# Patient Record
Sex: FEMALE | Race: WHITE | NOT HISPANIC OR LATINO | Employment: OTHER | URBAN - METROPOLITAN AREA
[De-identification: names, ages, dates, MRNs, and addresses within clinical notes are randomized per-mention and may not be internally consistent; named-entity substitution may affect disease eponyms.]

---

## 2017-03-22 NOTE — PATIENT DISCUSSION
Ectropion Counseling: The nature of the diagnosis and associated symptoms were discussed. The patient understands that there is a risk of corneal exposure, redness, dryness and even scarring from chronic exposure. Artificial tears, lubricating gel or ointment are recommended for the protection of the ocular surface.   Return for follow-up as scheduled or sooner if symptoms worsen

## 2017-03-22 NOTE — PATIENT DISCUSSION
Dermatochalasis Counseling: I have explained the diagnosis of dermatochalasis to the patient. The resulting symptoms, including but not limited to visual field deficits, blepharitis, and/or dry eye symptoms from eyelid wick syndrome, that may result if left untreated were discussed with the patient. The patient understands that intervention is elective surgery and consultation with an oculoplastic specialist can be scheduled at their convenience.

## 2017-03-22 NOTE — PATIENT DISCUSSION
ECTROPION, OU: NOT VISUALLY SIGNIFICANT TO THE PATIENT. RECOMMEND ARTIFICIAL TEARS OR OR LUBRICATING OINTMENT PRN.

## 2017-08-21 ENCOUNTER — ALLSCRIPTS OFFICE VISIT (OUTPATIENT)
Dept: OTHER | Facility: OTHER | Age: 72
End: 2017-08-21

## 2017-08-21 DIAGNOSIS — Z12.31 ENCOUNTER FOR SCREENING MAMMOGRAM FOR MALIGNANT NEOPLASM OF BREAST: ICD-10-CM

## 2017-09-28 NOTE — PATIENT DISCUSSION
1.  Nuclear Sclerotic Cataract OD:  Large Myopic shift---  Explained how cataracts can effect vision. Pt wants to have cat removed. Pt interested  in dist OD and will use readers for close up. 2.  Pseudophakia OS - IOL stable. Open capsule OS Monitor. 3. Dry Eyes OU --   Pt has been able to control with drops and gels but had a bad month of July. Her allergies have been off and on. Environmental changes to minimize dryness and exposure and the use of artificial tears were recommended. No plugs present in ou.   4.  1 [employer] OD cat

## 2017-10-12 NOTE — PATIENT DISCUSSION
1.  Cataract OD: Discussed the risks benefits alternatives and limitations of cataract surgery including infection bleeding loss of vision retinal tears detachment. The patient stated a full understanding and a desire to proceed with the procedure in the right eye. Refractive options reviewed. H/o [address] ou Patient understands IOL calcs are more difficult and that intraoperative ORA measurements will increase the success but does not guarantee not needing a refractive enhancement. Pt desired to schedule surgery OD with ORA guidance. Quoc admit. 2. Pseudophakia OS - IOL stable. Monitor. 3.  s/p PRK4. Return for an appointment for [address] [place] [address] with Dr. Evelyn Matthew.

## 2017-10-25 NOTE — PATIENT DISCUSSION
Cataract OD: Discussed the risks benefits alternatives and limitations of cataract surgery including infection bleeding loss of vision retinal tears detachment. The patient stated a full understanding and a desire to proceed with the procedure in the right eye. Refractive options reviewed. Patient understands IOL calcs are more difficult and that intraoperative ORA measurements will increase the success but does not guarantee not needing a refractive enhancement. Pt desired to schedule surgery OD with ORA guidance.

## 2017-10-25 NOTE — PATIENT DISCUSSION
Combined Types of Cataract OD: Discussed the risks benefits alternatives and limitations of cataract surgery including infection bleeding loss of vision retinal tears detachment. The patient stated a full understanding and a desire to proceed with the procedure in the right eye. Refractive options were reviewed. Patient has elected to be optimized for distance vision in the right eye. The patient will still need glasses for reading and to possibly fine tune distance vision.

## 2017-11-16 NOTE — PATIENT DISCUSSION
1.  Pseudophakia OD - Post-Op Day #1 - Cataract Surgery Right Eye (OD) - doing well. IOP slightly elevated. Continue po drops as instructed. Call office with symptoms of pain redness or decreased vision right eye. Use tears often. Add Lumigan qhs OD till next visit. 2.   RTN 1 week PO-  change rx

## 2017-11-27 NOTE — PATIENT DISCUSSION
1.  Pseudophakia OD - 1 week PO-- Surgery very well healed. Continue to use artificial tears as needed for ocular surface dryness. Finish po drops according to schedule. Dc Travatan Cont with other 2 cat meds. and lots of tears. 2.  Change rx in both lenses. 3. RTN 5-6 mths CE after OD cat sx.

## 2018-01-14 VITALS
HEIGHT: 61 IN | DIASTOLIC BLOOD PRESSURE: 72 MMHG | BODY MASS INDEX: 26.06 KG/M2 | SYSTOLIC BLOOD PRESSURE: 124 MMHG | WEIGHT: 138 LBS

## 2018-02-26 NOTE — PATIENT DISCUSSION
1.  Pseudophakia OU - IOL stable. MONO  OD capsule clear OS capsule open. Monitor. 2. Dry Eyes OU -- Environmental changes to minimize dryness and exposure and the use of artificial tears were recommended. Pt using tears and gels to control it. Allergies have been bad. 3. Rx for reading. Has dist rx4. Floaters ou.5..   RTN 1 yr CE

## 2018-06-19 RX ORDER — MAGNESIUM 30 MG
30 TABLET ORAL 2 TIMES DAILY
COMMUNITY

## 2018-06-19 RX ORDER — DIPHENOXYLATE HYDROCHLORIDE AND ATROPINE SULFATE 2.5; .025 MG/1; MG/1
1 TABLET ORAL DAILY
COMMUNITY

## 2018-06-19 RX ORDER — AMLODIPINE BESYLATE 5 MG/1
5 TABLET ORAL DAILY
COMMUNITY

## 2018-06-19 RX ORDER — PHENOL 1.4 %
600 AEROSOL, SPRAY (ML) MUCOUS MEMBRANE 2 TIMES DAILY WITH MEALS
COMMUNITY

## 2018-06-19 RX ORDER — SUMATRIPTAN 25 MG/1
25 TABLET, FILM COATED ORAL ONCE AS NEEDED
COMMUNITY

## 2018-06-19 NOTE — PRE-PROCEDURE INSTRUCTIONS
Pre-Surgery Instructions:   Medication Instructions    amLODIPine (NORVASC) 5 mg tablet Instructed patient per Anesthesia Guidelines   calcium carbonate (OS-JR) 600 MG tablet Patient was instructed by Physician and understands   magnesium 30 MG tablet Patient was instructed by Physician and understands   multivitamin [facility]) TABS Patient was instructed by Physician and understands   SUMAtriptan (IMITREX) 25 mg tablet Instructed patient per Anesthesia Guidelines  Pre op and bathing instructions reviewed

## 2018-06-22 ENCOUNTER — ANESTHESIA EVENT (OUTPATIENT)
Dept: PERIOP | Facility: HOSPITAL | Age: 73
End: 2018-06-22
Payer: MEDICARE

## 2018-06-25 ENCOUNTER — HOSPITAL ENCOUNTER (OUTPATIENT)
Facility: HOSPITAL | Age: 73
Setting detail: OUTPATIENT SURGERY
Discharge: HOME | End: 2018-06-26
Attending: OBSTETRICS & GYNECOLOGY | Admitting: OBSTETRICS & GYNECOLOGY
Payer: MEDICARE

## 2018-06-25 ENCOUNTER — ANESTHESIA (OUTPATIENT)
Dept: PERIOP | Facility: HOSPITAL | Age: 73
End: 2018-06-25
Payer: MEDICARE

## 2018-06-25 PROBLEM — N81.9 PROLAPSE OF FEMALE PELVIC ORGANS: Status: ACTIVE | Noted: 2018-06-25

## 2018-06-25 PROCEDURE — 94762 N-INVAS EAR/PLS OXIMTRY CONT: CPT

## 2018-06-25 PROCEDURE — 94760 N-INVAS EAR/PLS OXIMETRY 1: CPT

## 2018-06-25 PROCEDURE — C1771 REP DEV, URINARY, W/SLING: HCPCS | Performed by: OBSTETRICS & GYNECOLOGY

## 2018-06-25 PROCEDURE — C1781 MESH (IMPLANTABLE): HCPCS | Performed by: OBSTETRICS & GYNECOLOGY

## 2018-06-25 DEVICE — SINGLE INCISION SLING SYSTEM
Type: IMPLANTABLE DEVICE | Site: VAGINA | Status: FUNCTIONAL
Brand: ALTIS

## 2018-06-25 DEVICE — SLING PROLAPSE REPAIR TRANSVAGINAL ELEVATE ANTERIOR 720093-01: Type: IMPLANTABLE DEVICE | Site: UTERUS | Status: FUNCTIONAL

## 2018-06-25 RX ORDER — SODIUM CHLORIDE, SODIUM LACTATE, POTASSIUM CHLORIDE, CALCIUM CHLORIDE 600; 310; 30; 20 MG/100ML; MG/100ML; MG/100ML; MG/100ML
20 INJECTION, SOLUTION INTRAVENOUS CONTINUOUS
Status: DISCONTINUED | OUTPATIENT
Start: 2018-06-25 | End: 2018-06-26 | Stop reason: HOSPADM

## 2018-06-25 RX ORDER — SODIUM CHLORIDE 9 MG/ML
75 INJECTION, SOLUTION INTRAVENOUS CONTINUOUS
Status: DISCONTINUED | OUTPATIENT
Start: 2018-06-25 | End: 2018-06-26 | Stop reason: HOSPADM

## 2018-06-25 RX ORDER — LIDOCAINE HYDROCHLORIDE 20 MG/ML
INJECTION, SOLUTION EPIDURAL; INFILTRATION; INTRACAUDAL; PERINEURAL AS NEEDED
Status: DISCONTINUED | OUTPATIENT
Start: 2018-06-25 | End: 2018-06-25 | Stop reason: SURG

## 2018-06-25 RX ORDER — TRAMADOL HYDROCHLORIDE 50 MG/1
50 TABLET ORAL EVERY 6 HOURS PRN
Status: DISCONTINUED | OUTPATIENT
Start: 2018-06-25 | End: 2018-06-26 | Stop reason: HOSPADM

## 2018-06-25 RX ORDER — IBUPROFEN 600 MG/1
600 TABLET ORAL EVERY 6 HOURS PRN
Status: DISCONTINUED | OUTPATIENT
Start: 2018-06-25 | End: 2018-06-26 | Stop reason: HOSPADM

## 2018-06-25 RX ORDER — SODIUM CHLORIDE, SODIUM LACTATE, POTASSIUM CHLORIDE, CALCIUM CHLORIDE 600; 310; 30; 20 MG/100ML; MG/100ML; MG/100ML; MG/100ML
INJECTION, SOLUTION INTRAVENOUS CONTINUOUS PRN
Status: DISCONTINUED | OUTPATIENT
Start: 2018-06-25 | End: 2018-06-25 | Stop reason: SURG

## 2018-06-25 RX ORDER — ONDANSETRON 2 MG/ML
4 INJECTION INTRAMUSCULAR; INTRAVENOUS ONCE AS NEEDED
Status: DISCONTINUED | OUTPATIENT
Start: 2018-06-25 | End: 2018-06-25 | Stop reason: HOSPADM

## 2018-06-25 RX ORDER — ONDANSETRON 2 MG/ML
4 INJECTION INTRAMUSCULAR; INTRAVENOUS EVERY 6 HOURS PRN
Status: DISCONTINUED | OUTPATIENT
Start: 2018-06-25 | End: 2018-06-26 | Stop reason: HOSPADM

## 2018-06-25 RX ORDER — PROPOFOL 10 MG/ML
INJECTION, EMULSION INTRAVENOUS CONTINUOUS PRN
Status: DISCONTINUED | OUTPATIENT
Start: 2018-06-25 | End: 2018-06-25 | Stop reason: SURG

## 2018-06-25 RX ORDER — DOCUSATE SODIUM 100 MG/1
100 CAPSULE, LIQUID FILLED ORAL 2 TIMES DAILY
Status: DISCONTINUED | OUTPATIENT
Start: 2018-06-25 | End: 2018-06-26 | Stop reason: HOSPADM

## 2018-06-25 RX ORDER — FUROSEMIDE 10 MG/ML
INJECTION INTRAMUSCULAR; INTRAVENOUS AS NEEDED
Status: DISCONTINUED | OUTPATIENT
Start: 2018-06-25 | End: 2018-06-25 | Stop reason: SURG

## 2018-06-25 RX ORDER — FENTANYL CITRATE/PF 50 MCG/ML
25 SYRINGE (ML) INJECTION
Status: DISCONTINUED | OUTPATIENT
Start: 2018-06-25 | End: 2018-06-25 | Stop reason: HOSPADM

## 2018-06-25 RX ORDER — SUMATRIPTAN 25 MG/1
25 TABLET, FILM COATED ORAL ONCE AS NEEDED
Status: DISCONTINUED | OUTPATIENT
Start: 2018-06-25 | End: 2018-06-26 | Stop reason: HOSPADM

## 2018-06-25 RX ORDER — CALCIUM CARBONATE 500(1250)
1 TABLET ORAL
Status: DISCONTINUED | OUTPATIENT
Start: 2018-06-26 | End: 2018-06-26 | Stop reason: HOSPADM

## 2018-06-25 RX ORDER — AMLODIPINE BESYLATE 5 MG/1
5 TABLET ORAL DAILY
Status: DISCONTINUED | OUTPATIENT
Start: 2018-06-26 | End: 2018-06-26 | Stop reason: HOSPADM

## 2018-06-25 RX ORDER — FENTANYL CITRATE 50 UG/ML
INJECTION, SOLUTION INTRAMUSCULAR; INTRAVENOUS AS NEEDED
Status: DISCONTINUED | OUTPATIENT
Start: 2018-06-25 | End: 2018-06-25 | Stop reason: SURG

## 2018-06-25 RX ORDER — KETOROLAC TROMETHAMINE 30 MG/ML
INJECTION, SOLUTION INTRAMUSCULAR; INTRAVENOUS AS NEEDED
Status: DISCONTINUED | OUTPATIENT
Start: 2018-06-25 | End: 2018-06-25 | Stop reason: SURG

## 2018-06-25 RX ORDER — BUPIVACAINE HYDROCHLORIDE AND EPINEPHRINE 2.5; 5 MG/ML; UG/ML
INJECTION, SOLUTION INFILTRATION; PERINEURAL AS NEEDED
Status: DISCONTINUED | OUTPATIENT
Start: 2018-06-25 | End: 2018-06-25 | Stop reason: HOSPADM

## 2018-06-25 RX ORDER — ONDANSETRON 2 MG/ML
INJECTION INTRAMUSCULAR; INTRAVENOUS AS NEEDED
Status: DISCONTINUED | OUTPATIENT
Start: 2018-06-25 | End: 2018-06-25 | Stop reason: SURG

## 2018-06-25 RX ORDER — ACETAMINOPHEN 325 MG/1
650 TABLET ORAL EVERY 4 HOURS PRN
Status: DISCONTINUED | OUTPATIENT
Start: 2018-06-25 | End: 2018-06-26 | Stop reason: HOSPADM

## 2018-06-25 RX ADMIN — LIDOCAINE HYDROCHLORIDE 10 ML: 20 INJECTION, SOLUTION EPIDURAL; INFILTRATION; INTRACAUDAL; PERINEURAL at 07:45

## 2018-06-25 RX ADMIN — LIDOCAINE HYDROCHLORIDE 5 ML: 20 INJECTION, SOLUTION EPIDURAL; INFILTRATION; INTRACAUDAL; PERINEURAL at 07:52

## 2018-06-25 RX ADMIN — FENTANYL CITRATE 25 MCG: 50 INJECTION INTRAMUSCULAR; INTRAVENOUS at 07:37

## 2018-06-25 RX ADMIN — ONDANSETRON 4 MG: 2 INJECTION INTRAMUSCULAR; INTRAVENOUS at 08:50

## 2018-06-25 RX ADMIN — CEFAZOLIN SODIUM 1000 MG: 1 SOLUTION INTRAVENOUS at 07:55

## 2018-06-25 RX ADMIN — FENTANYL CITRATE 25 MCG: 50 INJECTION INTRAMUSCULAR; INTRAVENOUS at 07:42

## 2018-06-25 RX ADMIN — DOCUSATE SODIUM 100 MG: 100 CAPSULE, LIQUID FILLED ORAL at 17:29

## 2018-06-25 RX ADMIN — PROPOFOL 30 MCG/KG/MIN: 10 INJECTION, EMULSION INTRAVENOUS at 07:54

## 2018-06-25 RX ADMIN — SODIUM CHLORIDE, SODIUM LACTATE, POTASSIUM CHLORIDE, AND CALCIUM CHLORIDE: .6; .31; .03; .02 INJECTION, SOLUTION INTRAVENOUS at 07:10

## 2018-06-25 RX ADMIN — DEXAMETHASONE SODIUM PHOSPHATE 5 MG: 10 INJECTION INTRAMUSCULAR; INTRAVENOUS at 08:00

## 2018-06-25 RX ADMIN — FUROSEMIDE 10 MG: 10 INJECTION, SOLUTION INTRAMUSCULAR; INTRAVENOUS at 09:22

## 2018-06-25 RX ADMIN — KETOROLAC TROMETHAMINE 15 MG: 30 INJECTION, SOLUTION INTRAMUSCULAR at 09:37

## 2018-06-25 RX ADMIN — SODIUM CHLORIDE 75 ML/HR: 0.9 INJECTION, SOLUTION INTRAVENOUS at 15:24

## 2018-06-25 RX ADMIN — ACETAMINOPHEN 650 MG: 325 TABLET, FILM COATED ORAL at 15:24

## 2018-06-25 NOTE — ADDENDUM NOTE
Addendum  created 06/25/18 1330 by Espinoza Calhoun MD    Anesthesia Intra Blocks edited, Sign clinical note

## 2018-06-25 NOTE — DISCHARGE INSTRUCTIONS
Remove Vaginal Packing on Post Op Day 3!!! Cystocele   WHAT YOU NEED TO KNOW:   A cystocele is a condition where a part of your bladder falls into your vagina because of weakened or stretched pelvic muscles  In some cases your bladder may begin to slip through your vaginal opening  DISCHARGE INSTRUCTIONS:   Follow up with your healthcare provider or gynecologist as directed:  Write down your questions so you remember to ask them during your visits  Keep a diary:  Record the number of times you urinate each day  Describe the color and amount of your urine  Bring the diary to your follow-up visits  Manage your symptoms:   · Do Kegel exercises regularly: These exercises can help your pelvic floor muscles get stronger  Ask your healthcare provider for more information about Kegel exercises  Tighten the muscles of your pelvis (the muscles you use to stop urinating)  Hold the muscles tight for 5 seconds, then relax for 5 seconds  Gradually work up to hold the muscles contracted for 10 seconds  Do at least 3 sets of 10 repetitions a day  · Avoid straining:  Do not lift heavy objects, stand for long periods of time, or strain to have a bowel movement  Prevent constipation by drinking plenty of liquids and eating foods high in fiber  Ask how much liquid you should drink every day  High fiber foods include fresh fruits, vegetables, and whole grains  · Maintain a healthy weight:  Ask your healthcare provider if you are at a healthy weight and what is the best exercise program for you  Try to exercise at least 30 minutes every day  Exercise can also help prevent constipation  Contact your healthcare provider or gynecologist if:   · You have a fever  · You have chills or feel weak and achy  · You have lower abdominal pain or back pain that does not go away  · You cannot urinate  · You have questions or concerns about your condition or care    Seek care immediately or call 911 if:   · You have abnormal bleeding from your vagina  · You have a mass coming out of your vagina that you cannot push back in     · You have severe lower abdominal pain  · You have a bad-smelling discharge coming from your vagina  © 2017 [employer] Information is for End User's use only and may not be sold, redistributed or otherwise used for commercial purposes  All illustrations and images included in CareNotes® are the copyrighted property of A D A M , Inc  or [employer]y Kapoor  The above information is an  only  It is not intended as medical advice for individual conditions or treatments  Talk to your doctor, nurse or pharmacist before following any medical regimen to see if it is safe and effective for you  Bladder Sling Procedure   WHAT YOU NEED TO KNOW:   A bladder sling procedure is surgery to treat urinary incontinence in women  The sling acts as a hammock to keep your urethra in place and hold it closed when your bladder is full  You may have vaginal bleeding or discharge for up to a week after your surgery  Use sanitary pads  Do not use tampons  You may have some pelvic discomfort or trouble urinating  DISCHARGE INSTRUCTIONS:   Call 911 for any of the following:   · You have sudden trouble breathing  Seek care immediately if:   · Your bleeding gets worse  · You have yellow or foul smelling discharge from your vagina  · You cannot urinate, or you are urinating less than what is normal for you  · You feel confused  Contact your healthcare provider if:   · You have a fever  · You do not feel like you are able to empty your bladder completely when you urinate  · You feel the need to urinate very suddenly  · You have burning or stinging when you urinate  · You have blood in your urine  · Your skin is itchy, swollen, or you have a rash  · You have questions or concerns about your condition or care    Medicines:   · Prescription pain medicine  may be given  Ask your how to take this medicine safely  · Take your medicine as directed  Contact your healthcare provider if you think your medicine is not helping or if you have side effects  Tell him or her if you are allergic to any medicine  Keep a list of the medicines, vitamins, and herbs you take  Include the amounts, and when and why you take them  Bring the list or the pill bottles to follow-up visits  Carry your medicine list with you in case of an emergency  Self-catheterization:  You may need to put a catheter into your bladder after you urinate to empty any remaining urine  A catheter is a small rubber tube used to drain urine  Healthcare providers will teach you how to put the catheter in safely  This may be needed until you are completely emptying your bladder  Garcia catheter: You may have a Garcia catheter for a short period of time  The Garcia is a tube put into your bladder to drain urine into a bag  Keep the bag below your waist  This will prevent urine from flowing back into your bladder and causing an infection or other problems  Also, keep the tube free of kinks so the urine will drain properly  Do not pull on the catheter  This can cause pain and bleeding, and may cause the catheter to come out  Activity:  Do not lift heavy objects for 6 weeks after your procedure  Do not have intercourse for 4 to 6 weeks  Do not use a tampon for 4 weeks  Ask your healthcare provider when you can return to work or your usual activities  Do pelvic muscle exercises: These are also called Kegel exercises  These exercises help strengthen your pelvic muscles and help prevent urine leakage  Tighten the muscles of your pelvis and hold them tight for 5 seconds, then relax for 5 seconds  Gradually work up to tightening them for 10 seconds and relaxing for 10 seconds  Do this 3 times each day  Keep a record:  Keep a record of when you urinate and if you leak any urine   Write down what you were doing when you leaked urine, such as coughing or sneezing  Bring the log to your follow-up visits  Prevent constipation:  Drink liquids as directed  You may need to drink more water than usual to soften your bowel movements  Eat a variety of healthy foods, especially fruit and foods high in fiber  You may need to use an over-the-counter bowel movement softener  Follow up with your healthcare provider as directed: You may need a test to check how much urine remains in your bladder after you urinate  This will help show how the sling is working  Write down your questions so you remember to ask them during your visits  © 2017 [employer]  Information is for End User's use only and may not be sold, redistributed or otherwise used for commercial purposes  All illustrations and images included in CareNotes® are the copyrighted property of [employer]  or Corey Kapoor  The above information is an  only  It is not intended as medical advice for individual conditions or treatments  Talk to your doctor, nurse or pharmacist before following any medical regimen to see if it is safe and effective for you

## 2018-06-25 NOTE — ANESTHESIA PROCEDURE NOTES
Epidural Block    Patient location during procedure: OR  Start time: 6/25/2018 7:45 AM  Staffing  Performed: anesthesiologist   Preanesthetic Checklist  Completed: patient identified, site marked, surgical consent, pre-op evaluation, timeout performed, IV checked, risks and benefits discussed and monitors and equipment checked  Epidural  Patient position: sitting  Prep: Betadine  Patient monitoring: heart rate, cardiac monitor, continuous pulse ox and frequent blood pressure checks  Approach: midline  Location: lumbar (1-5)  Injection technique: RENETTA air  Needle  Needle type: Tuohy   Needle gauge: 18 G  Catheter type: side hole  Catheter size: epidural cath    Catheter at skin depth: 10 cm  Test dose: negative and lidocaine 1 5% with epinephrine 1-to-200,000  Assessment  Sensory level: [id number] aspiration for CSF, negative aspiration for heme and no paresthesia on injection  patient tolerated the procedure well with no immediate complications

## 2018-06-25 NOTE — OP NOTE
OPERATIVE REPORT  PATIENT NAME: Chiquis Gold    :  1945  MRN: [id number]  Pt Location: AN OR ROOM 03    SURGERY DATE: 2018    Surgeon(s) and Role: * Елена Greenwood MD - Primary     * Abby Verma MD - Assisting    Preop Diagnosis:  Incomplete uterovaginal prolapse [N81 2]  Cystocele, midline [N81 11]  Rectocele [N81 6]  Incompetence or weakening of pubocervical tissue [N81 82]  Incompetence or weakening of rectovaginal tissue [N81 83]  Stress incontinence [N39 3]  Hypermobility of urethra [N36 41]    Post-Op Diagnosis Codes:     * Incomplete uterovaginal prolapse [N81 2]     * Cystocele, midline [N81 11]     * Rectocele [N81 6]     * Incompetence or weakening of pubocervical tissue [N81 82]     * Incompetence or weakening of rectovaginal tissue [N81 83]     * Stress incontinence [N39 3]     * Hypermobility of urethra [N36 41]    Procedure(s) (LRB):  ANTERIOR VE COLPOPEXY (N/A)  A&P COLPORRHAPHY (N/A)  PUBOVAGINAL SLING (N/A)  CYSTOSCOPY (N/A)    Specimen(s): nONE    Estimated Blood Loss:   12 mL    Drains:  Urethral Catheter Non-latex 18 Fr  (Active)   Number of days: 0       Anesthesia Type:   Epidural    Operative Indications:  Incomplete uterovaginal prolapse [N81 2]  Cystocele, midline [N81 11]  Rectocele [N81 6]  Incompetence or weakening of pubocervical tissue [N81 82]  Incompetence or weakening of rectovaginal tissue [N81 83]  Stress incontinence [N39 3]  Hypermobility of urethra [N36 41]    Operative Findings: Moderate trabeculation of bladder  Cystocele  Perineal skin tags    Complications:   None    Procedure and Technique:  Appropriate preoperative antibiotics chosen per ACOG guidelines were given  Bilateral SCDs were placed in the lower extremities for DVT prevention prior to the institution of anesthesia  No bladder, ureteral, viscus, or solid organ injury were noted at the end of the procedure  Two separate pieces of mesh was used during this patient's reconstructive surgery  The Elevate mesh was placed to correct for the patient's apical and anterior compartment defect  A separate mesh sling was used to correct for the patient's stress incontinence  Epidural anesthesia was administered in the preoperative holding area  The patient was taken to the operating room where she was placed in the dorsal lithotomy position  Once this was found to be adequate, she was placed with her lower extremities supported in adjustable-type Jt stirrups  Care was taken to avoid excessive flexion or extension of her hip and knee joints  Once the patient was properly positioned, she was prepped and draped and a Garcia catheter was inserted under sterile technique  Operative exam confirmed the preoperative findings  Attention first turned to the anterior vaginal extraperitoneal colpopexy and the anterior colporrhaphy  The procedure was begun by grasping the anterior vaginal wall with two Allis clamps for traction  Anesthetic solution of 0 25% Marcaine with epinephrine diluted 1:1 with injectable saline was infiltrated into the paravesical space using an epidural-type needle until precise hydrodissection into the space  After 60 cc had been administered, a midline anterior vaginal wall incision was made approximately 4 cm in length, incising the vaginal epithelium, the muscularis and the pubocervical fascia  This allowed direct entry into the true vesicovaginal space  There was significant scarring noted from a prior anterior colporrhaphy that the patient had many years ago with significant scarring in the paravaginal spaces as well  Careful lateral dissection was carried out until the paravaginal and paravesical spaces were reached, allowing palpation of the obturator internus muscle, as well as the iliococcygeus and the pubococcygeus  The base of the ischial spine was identified, including the arcus tendineus and the sacrospinous ligaments bilaterally  The bladder was drained, draining clear urine  The sacrospinous ligament on the patient's left side was identified  At this point, the Elevate device was loaded and was brought onto the field and placed through the left paravaginal and paravesical space towards the level of the sacrospinous ligament on the patient's left side  The device was used to place the anchor and attached 2-0 Prolene suture through the sacrospinous ligament on the patient's left side approximately 2 cm medial to the ischial spine with the rectum deviated towards the midline and the bladder as well deviated cephalad, as well as towards the midline  The device was removed and handed off  The stitch was held in the meantime with a hemostat  The same procedure was repeated on the right  This anchor was placed through the patient's right sacrospinous ligament 2 cm medial to the ischial spine with the rectum deviated midline, as well as the bladder deviated cephalad at midline  Again, the device was removed and the excess Prolene tail of the stitch was held for the time being  Then 2-0 PDS suture was placed at the level of the distal pubocervical fascia at the urethrovesical junction and held for the time being  Two additional separate sutures of 2-0 Prolene were placed at the level of the cervix and held for the time being  The Elevate mesh was brought onto the field and cut and sized to fit the patient's dimensions of the anterior vaginal wall  This area of the mesh was held in place at the level of the vagina with the tacking suture that was placed at the level of the distal pubocervical fascia  Additionally, an anchoring suture was placed in the patient's arcus tendineus on the right side, which was attached to the right side of the anterior arm portion of the anterior mesh  Similarly, the left anterior arm portion of the anterior mesh was secured to the patient's left arcus tendineus with the anchor sure device    The apical portion of the mesh was secured with the 2-0 Prolene that had been placed previously at the level of the cervix, and the deep arms of the anterior elevate mesh were then secured in place with the left arm secured in place using the previously placed Prolene stitch at the level of the sacrospinous ligament, which was tied down to the level of the sacrospinous ligament  The right sacrospinous stitch was placed at the right deep arm of the mesh and secured down to the level of the sacrospinous ligament using a medium-sized vessel clip which was used to place the right deep arm of the mesh down to the level of the sacrospinous ligament and secure it in place  The excess sutures were all trimmed, and the mesh was copiously irrigated  The anterior colporrhaphy was then closed using 2-0 Vicryl suture in a running locked fashion with care taken to incorporate a full-thickness closure, incorporating vaginal epithelium, underlying muscular layers and fascial layers of the vaginal wall  This completed the colpopexy and anterior colporraphy    We turned our attention for performance of the single incision sling  At this time, the mid urethral zone was identified in reference to the Garcia catheter and urethral meatus and local anesthetic solution was injected into the anterior vaginal wall at the mid urethral level for hydrodissection and vasoconstriction  Next, 10 mL was injected at the midline  An additional 7 mL were injected to the left and right of midline directing the infiltration laterally towards the cephalad aspect of the inferior pubic ramus bilaterally  Care was taken to ensure that the sulcus was flattened and free of infiltration to minimize chance for buttonholing or tapping too close to the anterolateral sulcus  After completion of hydrodissection, 2 Allis clamps were used to grasp the anterior vaginal wall for traction  A 1 5 cm incision was made to the midline  Two Allis clamps were then placed on each cut edge of the incision for stabilization   Tenotomy scissors were used to create a small vaginal tunnel with sharp and blunt dissection above the anterior vaginal wall directed laterally towards the cephalad aspect of the inferior pubic ramus bilaterally  Dissection was carried out to the edge of the bone itself but no dissection into the obturator internus muscle  Once the dissection was complete, the sling was placed  The Altis system was used  An index finger was placed in the vagina for guidance  The Altis trocar was placed into the pre-dissected tract  The handle was held in horizontal slight upward canting to avoid buttonholing of the sulcus  Cephalad drift was used to allow passage around the inferior pubic ramus  A thumb was placed on the heel of the introducer to allow a push/pivot maneuver to place a fixed anchor into the obturator internus muscle membrane complex on the patient's left side  Proper handle deviation confirmed proper anchor placement, as well as a gentle tugging on the sling  Once the introducer was removed, it also confirmed proper anchor placement  The exact same sequence of steps was repeated on the patient's right side with adjustable anchor  Once it was complete, the introducer was removed and gentle tugging again confirmed proper anchor placement  The Garcia catheter was then used to drain the bladder and then it was removed and a diagnostic cystoscopy was performed by instilling 300 mL of fluid into the bladder  Both ureters were effluxing normally and there were no lacerations in the lower urinary tract  There was loss of fluid noted with an active cough  The tensioning suture was used to adjust the tape until there was minimal to no leakage with coughing  After appropriate tensioning, the tensioning suture was cut and the vaginal incision was closed with 2-0 Vicryl suture in a running locked stitch  We turned our attention for the performance of the posterior colporrhaphy   Two Allis clamps were placed over the posterior fourchette over the mucocutaneous border to reduce the markedly relaxed vaginal outlet  A russell shaped incision was made extending along the perineal body and vaginal mucosa with the bovie  The excess overlying tissue was then removed  After that, we started closure of the posterior colporrhaphy at the apex with 2-0 Vicryl suture in a running locked fashion, re approximated the bulbocavernosus and transverse perineal muscles with 0 vicryl , and re approximated the perineal skin thereby completing the perineorraphy  At this time, we placed the Garcia back in the bladder  We completed the procedure  Vaginal packing was placed in the vagina  There were no complications  The sponge, needle and instrument count were correct x2  The patient tolerated the procedure well and went to the recovery room in stable condition and she is stable at the moment of this dictation      A qualified resident physician was not available to first assist in this complex reconstructive procedure    Patient Disposition:  PACU     SIGNATURE: Donta Luevano MD  DATE: June 25, 2018  TIME: 10:00 AM

## 2018-06-25 NOTE — ANESTHESIA POSTPROCEDURE EVALUATION
Post-Op Assessment Note      CV Status:  Stable    Mental Status:  Alert    Hydration Status:  Stable    PONV Controlled:  None    Airway Patency:  Patent        Staff: CRNA     Post-op block assessment: adhesive bandage applied and catheter intact        BP      Temp      Pulse     Resp      SpO2

## 2018-06-25 NOTE — ANESTHESIA PREPROCEDURE EVALUATION
Review of Systems/Medical History  Patient summary reviewed    No history of anesthetic complications     Cardiovascular  EKG reviewed, Exercise tolerance (METS): >4,  Hyperlipidemia, Hypertension controlled,    Pulmonary  Negative pulmonary ROS        GI/Hepatic  Negative GI/hepatic ROS          Negative  ROS        Endo/Other  Negative endo/other ROS      GYN       Hematology  Negative hematology ROS      Musculoskeletal    Arthritis     Neurology    Headaches,    Psychology   Negative psychology ROS              Physical Exam    Airway    Mallampati score: II  TM Distance: >3 FB  Neck ROM: full     Dental   Comment: No loose,     Cardiovascular  Rhythm: regular, Rate: normal,     Pulmonary  Breath sounds clear to auscultation,     Other Findings        Anesthesia Plan  ASA Score- 2     Anesthesia Type- epidural and IV sedation with anesthesia with ASA Monitors  Additional Monitors:   Airway Plan:         Plan Factors-  Patient did not smoke on day of surgery  Induction- intravenous  Postoperative Plan-     Informed Consent- Anesthetic plan and risks discussed with patient  I personally reviewed this patient with the CRNA  Discussed and agreed on the Anesthesia Plan with the CRNA  Yvonne Palma

## 2018-06-26 VITALS
WEIGHT: 129.85 LBS | HEIGHT: 61 IN | OXYGEN SATURATION: 94 % | RESPIRATION RATE: 16 BRPM | TEMPERATURE: 98.1 F | HEART RATE: 67 BPM | SYSTOLIC BLOOD PRESSURE: 123 MMHG | BODY MASS INDEX: 24.52 KG/M2 | DIASTOLIC BLOOD PRESSURE: 67 MMHG

## 2018-06-26 RX ADMIN — AMLODIPINE BESYLATE 5 MG: 5 TABLET ORAL at 08:37

## 2018-06-26 RX ADMIN — Medication 1 TABLET: at 08:36

## 2018-06-26 RX ADMIN — ACETAMINOPHEN 650 MG: 325 TABLET, FILM COATED ORAL at 05:43

## 2018-06-26 RX ADMIN — DOCUSATE SODIUM 100 MG: 100 CAPSULE, LIQUID FILLED ORAL at 08:36

## 2018-06-26 NOTE — CASE MANAGEMENT
Initial Clinical Review    Age/Sex: 68 y o  female    Surgery Date: 6/25/2018    Procedure: ANTERIOR VE COLPOPEXY (N/A)  A&P COLPORRHAPHY (N/A)  PUBOVAGINAL SLING (N/A)  CYSTOSCOPY (N/A)       Anesthesia: epidural     Admission Orders: Date/Time/Statement:  6/25/2018  1034 Outpatient no charge bed   06/25/18 1034  Outpatient No Charge Bed Once     Transfer Service: Urogynecology       Question: Admitting Physician Answer: Spenser Rondon    06/25/18 1033                  Vital Signs: /67 (BP Location: Right arm)   Pulse 67   Temp 98 1 °F (36 7 °C) (Oral)   Resp 16   Ht 5' 1" (1 549 m)   Wt 58 9 kg (129 lb 13 6 oz)   SpO2 94%   BMI 24 54 kg/m²     Diet:        Diet Orders            Start     Ordered    06/25/18 1711  Room Service  Once     Question:  Type of Service  Answer:  Room Service - Appropriate with Assistance    06/25/18 1710    06/25/18 1034  Diet Regular; Regular House  Diet effective now     Question Answer Comment   Diet Type Regular    Regular Regular House    RD to adjust diet per protocol? No        06/25/18 1033          Mobility: must meet post epidural ambulation criteria  Up as tolerated  DVT Prophylaxis: scds    Pain Control:  tylenol prn - used x 2    Po medication:  Amlodipne  Calcium carbonate  Colace      ivf @ 75 cc/h

## 2018-06-26 NOTE — PROGRESS NOTES
Progress Note - Herminio Van 68 y o  female MRN: [id number]    Unit/Bed#: -01 Encounter: [id number]      Assessment:  68 y o  s/p anterior VEC, SIS, post colporrhaphy doing well POD #1    Plan:  Anticipate DC home after void trial  Postop instructions reviewed    Subjective:   No complaints overnight  Garcia just removed this AM  Ambulating, tolerating regular diet  BP somewhat labile overnight but pt denies h/a, photophobia nausea or vomiting  Objective:     Vitals: Blood pressure 123/67, pulse 67, temperature 98 1 °F (36 7 °C), temperature source Oral, resp  rate 16, height 5' 1" (1 549 m), weight 58 9 kg (129 lb 13 6 oz), SpO2 94 %  ,Body mass index is 24 54 kg/m²        Intake/Output Summary (Last 24 hours) at 06/26/18 0758  Last data filed at 06/26/18 0734   Gross per 24 hour   Intake             1230 ml   Output             4037 ml   Net            -2807 ml       Physical Exam:   abd soft nontender  LE no edema/calf tenderness     Invasive Devices     Peripheral Intravenous Line            Peripheral IV 06/25/18 Left Antecubital 1 day

## 2019-02-27 NOTE — PATIENT DISCUSSION
1.  Dermatochalasis OU:  Patient currently asymptomatic. Observe. 2. Several Floaters OU:  Patient was cautioned to call our office immediately if they experience a substantial change in their symptoms such as an increase in floaters persistent flashes loss of visual field 3. Pseudophakia OU - IOL stable. MONO  OD capsule clear OS capsule open. Monitor. 4. Dry Eyes OU -- Environmental changes to minimize dryness and exposure and the use of artificial tears were recommended. Pt using tears and gels to control it. Allergies have been bad. 5.  Has Rx for reading. Has dist rx6. William Osapril   RTN 1 yr CE

## 2019-04-03 NOTE — PATIENT DISCUSSION
MODERATE DRY EYES: PRESCRIBED DISAPPEARING PRESERVATIVE OR PRESERVATIVE FREE ARTIFICIAL TEARS 4-6X A DAY, OU AND THE DAILY INTAKE OF OMEGA-3 DHA/EPA FATTY ACIDS TO HELP RELIEVE SYMPTOMS. ADD NIGHTLY LUBRICATING OINTMENT OR GEL.

## 2019-04-03 NOTE — PATIENT DISCUSSION
Posterior Capsular Fibrosis Counseling: The posterior capsule is a thin, clear film that is behind the intraocular lens implant. When it becomes cloudy, it is called posterior capsular fibrosis. The diagnosis of posterior capsular fibrosis (PCF), also referred to as a secondary cataract or posterior capsular opacification (PCO), was discussed with the patient. Return for follow-up as scheduled or sooner if there is a change in vision.

## 2020-03-03 NOTE — PATIENT DISCUSSION
1.  Dermatochalasis OU:  Patient currently asymptomatic. Observe. 2. Several Floaters OU:  stable---Patient was cautioned to call our office immediately if they experience a substantial change in their symptoms such as an increase in floaters persistent flashes loss of visual field 3. Pseudophakia OU - IOL stable. MONO  OD capsule clear OS capsule open. Monitor. 4. Dry Eyes OU -- Environmental changes to minimize dryness and exposure and the use of artificial tears were recommended. Pt using tears and gels to control it. Allergies have been bad. 5.  Has Rx for reading. seldom using. Has dist rx- wants to change her rx suns6.. Pt was dx with A Fib 1/207.    RTN 1 yr CE

## 2020-12-21 ENCOUNTER — IMPORTED ENCOUNTER (OUTPATIENT)
Dept: URBAN - METROPOLITAN AREA CLINIC 50 | Facility: CLINIC | Age: 75
End: 2020-12-21

## 2020-12-22 ENCOUNTER — IMPORTED ENCOUNTER (OUTPATIENT)
Dept: URBAN - METROPOLITAN AREA CLINIC 50 | Facility: CLINIC | Age: 75
End: 2020-12-22

## 2021-01-18 ENCOUNTER — IMPORTED ENCOUNTER (OUTPATIENT)
Dept: URBAN - METROPOLITAN AREA CLINIC 50 | Facility: CLINIC | Age: 76
End: 2021-01-18

## 2021-01-18 NOTE — PATIENT DISCUSSION
"""Discussed with patient decreased visual potential after cataract surgery due to history of amblyopia. Patient understands. "" ""Phaco with IOL OD: 01/26/2021 Tecaj ZCB00 +29.50 Target: Distance

## 2021-01-26 ENCOUNTER — IMPORTED ENCOUNTER (OUTPATIENT)
Dept: URBAN - METROPOLITAN AREA CLINIC 50 | Facility: CLINIC | Age: 76
End: 2021-01-26

## 2021-01-26 NOTE — PATIENT DISCUSSION
"""S/P IOL OD: Tecnis ZCB00 +29.50 (Target: Distance) +Femto/Arcs +Omidria.  Continue post operative instructions and drops per schedule. "" ""Stop Pred-Moxi-Nepaf right eye three times a day

## 2021-02-01 ENCOUNTER — IMPORTED ENCOUNTER (OUTPATIENT)
Dept: URBAN - METROPOLITAN AREA CLINIC 50 | Facility: CLINIC | Age: 76
End: 2021-02-01

## 2021-02-01 NOTE — PATIENT DISCUSSION
"""S/P IOL OD: Tecnis ZCB00 +29.50 (Target: Distance) +Femto/Arcs +Omidria. Continue post operative instructions and drops per schedule.  """

## 2021-02-09 ENCOUNTER — IMPORTED ENCOUNTER (OUTPATIENT)
Dept: URBAN - METROPOLITAN AREA CLINIC 50 | Facility: CLINIC | Age: 76
End: 2021-02-09

## 2021-02-09 NOTE — PATIENT DISCUSSION
"""S/P IOL OS: Tecnis ZCB00 +26.50 (Target: [place]) +Femto/Arcs +Omidria.  Continue post operative instructions and drops per schedule. "" ""Increase Pred-Moxi-Nepaf left eye three times a day

## 2021-02-15 ENCOUNTER — IMPORTED ENCOUNTER (OUTPATIENT)
Dept: URBAN - METROPOLITAN AREA CLINIC 50 | Facility: CLINIC | Age: 76
End: 2021-02-15

## 2021-02-15 NOTE — PATIENT DISCUSSION
"""S/P IOL OS: Tecnis ZCB00 +26.50 (Target: [place]) +Femto/Arcs +Omidria. Continue post operative instructions and drops per schedule.  """

## 2021-03-03 NOTE — PATIENT DISCUSSION
1.  Dermatochalasis OU:  Patient currently asymptomatic. Observe. 2. Recent Onset PVD OS--Several Floaters OU:  stable---Patient was cautioned to call our office immediately if they experience a substantial change in their symptoms such as an increase in floaters persistent flashes loss of visual field 3. Pseudophakia OU - IOL stable. MONO  OD capsule clear OS capsule open. Monitor. 4. Dry Eyes OU -- Environmental changes to minimize dryness and exposure and the use of artificial tears were recommended. Pt using tears and gels to control it. Allergies have been bad. 5.  Has Rx for reading. using alot more. .  Has dist rx- wants to change her rx suns6.. Pt was dx with A Fib 1/207.    RTN 1 yr CE

## 2021-03-22 ENCOUNTER — IMPORTED ENCOUNTER (OUTPATIENT)
Dept: URBAN - METROPOLITAN AREA CLINIC 50 | Facility: CLINIC | Age: 76
End: 2021-03-22

## 2021-03-22 NOTE — PATIENT DISCUSSION
"""Patient bothered by festoons underneath OU. Interested in possible surgery.  Will refer patient to ""

## 2021-03-22 NOTE — PATIENT DISCUSSION
"""S/P IOL OU: OD: Tecnis ZCB00 +29.50 (Target: Distance)Femto/ArcsOmidria. OS: Tecnis ZCB00 +26.50 (Target: [place])/Arcs. "

## 2021-04-17 ASSESSMENT — TONOMETRY
OS_IOP_MMHG: 15
OS_IOP_MMHG: 18
OD_IOP_MMHG: 17
OD_IOP_MMHG: 13
OS_IOP_MMHG: 10
OD_IOP_MMHG: 21
OS_IOP_MMHG: 13
OD_IOP_MMHG: 14
OS_IOP_MMHG: 13
OD_IOP_MMHG: 14
OS_IOP_MMHG: 14
OD_IOP_MMHG: 11
OS_IOP_MMHG: 12

## 2021-04-17 ASSESSMENT — VISUAL ACUITY
OD_BAT: 20/80
OS_OTHER: 20/80. 20/80.
OD_SC: 20/60
OS_CC: J1+
OD_SC: 20/200
OD_OTHER: 20/80. 20/100.
OS_PH: 20/25
OS_SC: 20/20-1
OS_PH: 20/50
OS_OTHER: 20/80. 20/100.
OS_BAT: 20/80
OS_BAT: 20/80
OS_CC: 20/70-
OD_CC: 20/50
OD_BAT: 20/80
OS_CC: 20/40
OD_SC: 20/30
OD_OTHER: 20/80. 20/100.
OD_CC: J1+
OS_CC: 20/30
OS_SC: 20/200
OD_PH: 20/40+2
OD_PH: 20/40+
OS_SC: 20/20-1
OS_OTHER: 20/80. 20/80.
OD_CC: 20/40+
OD_SC: 20/30-2
OD_CC: 20/40
OS_BAT: 20/80
OS_CC: 20/40

## 2021-04-17 ASSESSMENT — PACHYMETRY
OS_CT_UM: 546
OD_CT_UM: 525

## 2021-09-24 NOTE — PATIENT DISCUSSION
1.  PCO  OD (Posterior Capsule Opacification)  --Visually significant at this time. Schedule Yag eval DR Danielle . 2. Dermatochalasis OU:  Patient currently asymptomatic. Observe. 3. PVD OS--Several Floaters OU:  stable---Patient was cautioned to call our office immediately if they experience a substantial change in their symptoms such as an increase in floaters persistent flashes loss of visual field 3. Pseudophakia OU - IOL stable. MONO  OD PCO OS capsule open. Monitor. 4. Dry Eyes OU -- Environmental changes to minimize dryness and exposure and the use of artificial tears were recommended. Pt using tears and gels to control it. Allergies have been bad. 5.  Has Rx for reading. using alot more. .  Has dist rx- wants to change her rx suns6.. Pt was dx with A Fib 1/207.    RTN 1 mth Yag eval OD --DR Donna Chavez

## 2021-10-21 NOTE — PATIENT DISCUSSION
1.  PCO  OD (Posterior Capsule Opacification)   PCO is visually significant and impairment of vision does not meet the patient’s functional needs or interferes with activities of daily living. Risks benefits and alternatives to the Nd:YAG Laser reviewed including elevated IOP immediately postop and retinal tear/detachment. Patient to notify their ophthalmologist promptly if they have a significant change in symptoms such as flashes of light (photopsia) an increase in floaters loss of visual field or decrease in visual acuity after the procedure. Patient will be scheduled in [place] [id number] for Nd:YAG Laser. 2. Pseudophakia OU - IOLs stable. Monitor for changes in vision.

## 2021-11-11 NOTE — PATIENT DISCUSSION
s/p YAG laser capsulotomy for Posterior Capsule Opacification (PCO) in the Right Eye (OD). Doing better. Please contact us if you have a significant change in symptoms such as flashes of light (photopsia) increased floaters decrease/loss of visual field or visual acuity.

## 2021-11-11 NOTE — PATIENT DISCUSSION
1.  PCO  OD (Posterior Capsule Opacification)  --open capsule---2. Dermatochalasis OU:  Patient currently asymptomatic. Observe. 3. PVD OS--Several Floaters OU:  stable---Patient was cautioned to call our office immediately if they experience a substantial change in their symptoms such as an increase in floaters persistent flashes loss of visual field 3. Pseudophakia OU - IOL stable. MONO  OU  capsule open. Monitor. 4. Dry Eyes OU -- Environmental changes to minimize dryness and exposure and the use of artificial tears were recommended. Pt using tears and gels to control it. Allergies have been bad. 5.  Has Rx for reading. using alot more. .  Has dist rx- wants to change her rx suns6.. Pt was dx with A Fib 1/207.    RTN 3/22 CE

## 2022-01-18 ENCOUNTER — PREPPED CHART (OUTPATIENT)
Dept: URBAN - METROPOLITAN AREA CLINIC 49 | Facility: CLINIC | Age: 77
End: 2022-01-18

## 2022-01-18 NOTE — PATIENT DISCUSSION
"""S/P IOL OU: OD: Tecnis ZCB00 +29.50 (Target: Distance)Femto/ArcsOmidria. OS: Tecnis ZCB00 +26.50 (Target: [place])/Arcs. "

## 2022-01-24 ENCOUNTER — COMPREHENSIVE EXAM (OUTPATIENT)
Dept: URBAN - METROPOLITAN AREA CLINIC 49 | Facility: CLINIC | Age: 77
End: 2022-01-24

## 2022-01-24 DIAGNOSIS — H26.492: ICD-10-CM

## 2022-01-24 DIAGNOSIS — H53.001: ICD-10-CM

## 2022-01-24 DIAGNOSIS — H35.363: ICD-10-CM

## 2022-01-24 PROCEDURE — 92134 CPTRZ OPH DX IMG PST SGM RTA: CPT

## 2022-01-24 PROCEDURE — 92014 COMPRE OPH EXAM EST PT 1/>: CPT

## 2022-01-24 ASSESSMENT — VISUAL ACUITY
OD_CC: J7 @ 14IN
OD_CC: 20/40
OS_GLARE: 20/30
OD_GLARE: 20/40
OS_PH: 20/30
OS_CC: 20/60
OU_CC: 20/30-1
OD_GLARE: 20/50
OU_CC: J1 @ 14IN
OS_CC: J1+ @ 14IN
OS_GLARE: 20/25

## 2022-01-24 ASSESSMENT — TONOMETRY
OD_IOP_MMHG: 10
OD_IOP_MMHG: 11
OS_IOP_MMHG: 12
OS_IOP_MMHG: 12

## 2022-01-24 NOTE — PROCEDURE NOTE: CLINICAL
PROCEDURE NOTE: YAG Capsulotomy OS. Diagnosis: Posterior Capsular Opacification (PCO). Prep: Mydriacil 1% and Phenylephrine 2.5%. Prior to treatment, the risks/benefits/alternatives were discussed. The patient wished to proceed with procedure. Consent was signed. Proparacaine and brominidine were placed into the operative eye after the eye was dilated. Power = 3.0mJ. Number of pulses = 13. Patient tolerated procedure well and there were no complications. Post Laser instructions given. Hermann Sanchez

## 2022-01-24 NOTE — PATIENT DISCUSSION
PCO ([id number]): Visually significant PCO present on exam today. Recommend YAG laser capsulotomy to improve vision and decrease glare symptoms. RBAs of procedure discussed. Patient agrees and wishes to proceed.

## 2022-02-07 ENCOUNTER — CLINIC PROCEDURE ONLY (OUTPATIENT)
Dept: URBAN - METROPOLITAN AREA CLINIC 49 | Facility: CLINIC | Age: 77
End: 2022-02-07

## 2022-02-07 DIAGNOSIS — H26.491: ICD-10-CM

## 2022-02-07 PROCEDURE — 66821 AFTER CATARACT LASER SURGERY: CPT

## 2022-02-07 ASSESSMENT — VISUAL ACUITY
OS_CC: 20/30+2
OD_CC: 20/40

## 2022-02-07 ASSESSMENT — TONOMETRY
OS_IOP_MMHG: 13
OD_IOP_MMHG: 11
OD_IOP_MMHG: 12

## 2022-02-07 NOTE — PROCEDURE NOTE: CLINICAL
PROCEDURE NOTE: YAG Capsulotomy OD. Diagnosis: Posterior Capsular Opacification (PCO). Prep: Mydriacil 1% and Phenylephrine 2.5%. Prior to treatment, the risks/benefits/alternatives were discussed. The patient wished to proceed with procedure. Consent was signed. Proparacaine and brominidine were placed into the operative eye after the eye was dilated. Power = 3.0mJ. Number of pulses = 15. Patient tolerated procedure well and there were no complications. Post Laser instructions given. Zainab Prado

## 2022-03-02 NOTE — PATIENT DISCUSSION
1.  Dermatochalasis OU:  Patient currently asymptomatic. Observe. 3. PVD OS--Several Floaters OU:  stable---Patient was cautioned to call our office immediately if they experience a substantial change in their symptoms such as an increase in floaters persistent flashes loss of visual field 3. Pseudophakia OU - IOL stable. MONO  OU open capsule--4. Dry Eyes OU -- Been having issues with it--uisng gels during day and bedtime. Environmental changes to minimize dryness and exposure and the use of artificial tears were recommended. Pt using tears and gels to control it. Allergies have been bad. 5.  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 Has Rx for reading at 16-20 in. Needs rx for small drawing at 10-14 inches. Using Magnifiers. Has dist rx- wants to change her rx suns6.. Pt was dx with A Fib 1/207. RTN 11/22 CE and do Auto Ref -- eval dryness and corneal haze ou.

## 2022-04-05 ENCOUNTER — POST-OP (OUTPATIENT)
Dept: URBAN - METROPOLITAN AREA CLINIC 53 | Facility: CLINIC | Age: 77
End: 2022-04-05

## 2022-04-05 DIAGNOSIS — Z98.890: ICD-10-CM

## 2022-04-05 DIAGNOSIS — H35.363: ICD-10-CM

## 2022-04-05 PROCEDURE — 92134 CPTRZ OPH DX IMG PST SGM RTA: CPT

## 2022-04-05 ASSESSMENT — TONOMETRY
OS_IOP_MMHG: 13
OD_IOP_MMHG: 11

## 2022-04-05 ASSESSMENT — VISUAL ACUITY
OD_CC: 20/50 PUSH
OS_CC: 20/40

## 2022-04-05 NOTE — PATIENT DISCUSSION
Advised patient that dry eye can play a part in her double vision. Will have patient increase artificial tears 1gtt 3-4 times a day.  Provided patient with sample of Systane Complete PF.

## 2022-04-05 NOTE — PATIENT DISCUSSION
"h/o strabismus sx twice in the 1950's and 1960's. Denies ever having double vision, even at that time. States it was done for cosmetic purposes only. At today's visit, patient reports double vision while looking through phoropter and trying to read the chart while in our clinic. Also experiences double vision when reading chart at [facility] in current glasses (that are not from [facility]; unknown [address]). States she never had double vision prior to cataract surgery. She attributes her issues with diplopia to cataract surgery; Dr. Hardy Brock made a note at her first eye exam that reads as follows, ""h/o strabismus sx (1956 &1964) Discussed with patient decreased visual potential after cataract surgery due to history of amblyopia. Patient understands. Observe ."" Denies double vision while conversing with Dr. Lisbeth Zazueta in the office.  Patient has tried having glasses made at our practice three times

## 2022-04-05 NOTE — PATIENT DISCUSSION
Patient is not happy with vision since cataract surgery. States her vision has decreased since having cataract surgery. Advised patient we will inform the surgeon of her complaint.

## 2022-09-14 NOTE — PATIENT DISCUSSION
Has Rx for reading at 16-20 in. Needs rx for small drawing at 10-14 inches. Using Magnifiers. Has dist rx- wants to change her rx suns.

## 2022-09-14 NOTE — PATIENT DISCUSSION
- Been having issues with it--uisng gels during day and bedtime. Environmental changes to minimize dryness and exposure and the use of artificial tears were recommended. Pt using tears and gels to control it. Allergies have been bad.

## 2022-09-14 NOTE — PATIENT DISCUSSION
PVD OS--Several Floaters OU: stable---Patient was cautioned to call our office immediately if they experience a substantial change in their symptoms such as an increase in floaters persistent flashes loss of visual field.

## 2023-01-20 ENCOUNTER — ESTABLISHED PATIENT (OUTPATIENT)
Dept: URBAN - METROPOLITAN AREA CLINIC 49 | Facility: CLINIC | Age: 78
End: 2023-01-20

## 2023-01-20 DIAGNOSIS — H11.32: ICD-10-CM

## 2023-01-20 DIAGNOSIS — H04.123: ICD-10-CM

## 2023-01-20 DIAGNOSIS — H10.32: ICD-10-CM

## 2023-01-20 PROCEDURE — 92012 INTRM OPH EXAM EST PATIENT: CPT

## 2023-01-20 RX ORDER — AZITHROMYCIN DIHYDRATE 250 MG/1: TABLET, FILM COATED ORAL

## 2023-01-20 RX ORDER — ERYTHROMYCIN 5 MG/G
OINTMENT OPHTHALMIC TWICE A DAY
Start: 2023-01-20

## 2023-01-20 ASSESSMENT — VISUAL ACUITY
OS_PH: 20/25
OD_CC: 20/30
OS_CC: 20/40

## 2023-01-20 ASSESSMENT — TONOMETRY
OS_IOP_MMHG: 15
OD_IOP_MMHG: 13
OD_IOP_MMHG: 12
OS_IOP_MMHG: 15

## 2023-01-20 NOTE — PATIENT DISCUSSION
No abrasion noted on today's exam. No FB on Lid Eversion however patient reports FBS nasal OS. Thorough inspection confirmed no FB. Lids swapped and eyes flushed with eye wash.

## 2023-01-20 NOTE — PATIENT DISCUSSION
RTC in 1 week for f/u. If symptoms worsen before follow up, call. Patient can cancel appt if she feels better.

## (undated) DEVICE — SPONGE LAP 18 X 18 IN STRL RFD

## (undated) DEVICE — NEEDLE HYPO 22G X 1-1/2 IN

## (undated) DEVICE — REM POLYHESIVE ADULT PATIENT RETURN ELECTRODE: Brand: VALLEYLAB

## (undated) DEVICE — CURITY PLAIN PACKING STRIP: Brand: CURITY

## (undated) DEVICE — BULB SYRINGE,IRRIGATION WITH PROTECTIVE CAP: Brand: DOVER

## (undated) DEVICE — ALLENTOWN DR  LUCENTE S LAP PK: Brand: CARDINAL HEALTH

## (undated) DEVICE — SUT PDS II 2-0 CT-2 27 IN Z333H

## (undated) DEVICE — SMOKE EVACUATION TUBING WITH 8 IN INTEGRAL WAND AND SPONGE GUARD: Brand: BUFFALO FILTER

## (undated) DEVICE — BAG URINE DRAINAGE 2000ML ANTI RFLX LF

## (undated) DEVICE — 3000CC GUARDIAN II: Brand: GUARDIAN

## (undated) DEVICE — LIGHT HANDLE COVER SLEEVE DISP BLUE STELLAR

## (undated) DEVICE — TUBING SUCTION 5MM X 12 FT

## (undated) DEVICE — SYRINGE 10ML LL

## (undated) DEVICE — PREMIUM DRY TRAY LF: Brand: MEDLINE INDUSTRIES, INC.

## (undated) DEVICE — INTENDED FOR TISSUE SEPARATION, AND OTHER PROCEDURES THAT REQUIRE A SHARP SURGICAL BLADE TO PUNCTURE OR CUT.: Brand: BARD-PARKER SAFETY BLADES SIZE 11, STERILE

## (undated) DEVICE — BAG DECANTER

## (undated) DEVICE — GLOVE SRG LF STRL BGL SKNSNS 8 PF

## (undated) DEVICE — BASIC SINGLE BASIN 2-LF: Brand: MEDLINE INDUSTRIES, INC.

## (undated) DEVICE — PACKING VAGINAL 2 IN

## (undated) DEVICE — VIAL DECANTER

## (undated) DEVICE — 3M™ STERI-DRAPE™ UNDER BUTTOCKS DRAPE WITH POUCH 1084: Brand: STERI-DRAPE™

## (undated) DEVICE — BUTTON SWITCH PENCIL HOLSTER: Brand: VALLEYLAB

## (undated) DEVICE — SCD SEQUENTIAL COMPRESSION COMFORT SLEEVE MEDIUM KNEE LENGTH: Brand: KENDALL SCD

## (undated) DEVICE — NEEDLE SPINAL 20G X 3.5 LF

## (undated) DEVICE — CATH FOLEY 18FR 5ML 2 WAY SILICONE ELASTIMER

## (undated) DEVICE — FLOSEAL HEMOSTATIC MATRIX, 5 ML: Brand: FLOSEAL

## (undated) DEVICE — ELECTROSURGICAL DEVICE HOLSTER;FOR USE WITH MAXIMUM PEAK VOLTAGE OF 4000 V: Brand: FORCE TRIVERSE

## (undated) DEVICE — CHLORHEXIDINE 4PCT 4 OZ

## (undated) DEVICE — MEDI-VAC YANK SUCT HNDL W/TPRD BULBOUS TIP: Brand: CARDINAL HEALTH

## (undated) DEVICE — STERILE SURGICAL LUBRICANT,  TUBE: Brand: SURGILUBE

## (undated) DEVICE — CAUTERY TIP POLISHER: Brand: DEVON

## (undated) DEVICE — SUT VICRYL 2-0 SH 27 IN UNDYED J417H